# Patient Record
Sex: FEMALE | Race: WHITE | NOT HISPANIC OR LATINO | Employment: OTHER | URBAN - METROPOLITAN AREA
[De-identification: names, ages, dates, MRNs, and addresses within clinical notes are randomized per-mention and may not be internally consistent; named-entity substitution may affect disease eponyms.]

---

## 2019-03-29 ENCOUNTER — OFFICE VISIT (OUTPATIENT)
Dept: GASTROENTEROLOGY | Facility: CLINIC | Age: 74
End: 2019-03-29
Payer: COMMERCIAL

## 2019-03-29 VITALS
BODY MASS INDEX: 27.57 KG/M2 | SYSTOLIC BLOOD PRESSURE: 145 MMHG | HEART RATE: 60 BPM | HEIGHT: 62 IN | WEIGHT: 149.8 LBS | DIASTOLIC BLOOD PRESSURE: 72 MMHG

## 2019-03-29 DIAGNOSIS — Z12.11 COLON CANCER SCREENING: ICD-10-CM

## 2019-03-29 DIAGNOSIS — Z12.11 COLON CANCER SCREENING: Primary | ICD-10-CM

## 2019-03-29 DIAGNOSIS — K62.5 RECTAL BLEEDING: ICD-10-CM

## 2019-03-29 DIAGNOSIS — I25.10 CORONARY ARTERY DISEASE INVOLVING NATIVE CORONARY ARTERY OF NATIVE HEART WITHOUT ANGINA PECTORIS: Primary | ICD-10-CM

## 2019-03-29 PROCEDURE — 99204 OFFICE O/P NEW MOD 45 MIN: CPT | Performed by: NURSE PRACTITIONER

## 2019-03-29 RX ORDER — CLOPIDOGREL BISULFATE 75 MG/1
75 TABLET ORAL DAILY
COMMUNITY

## 2019-03-29 RX ORDER — CHLORAL HYDRATE 500 MG
1000 CAPSULE ORAL DAILY
COMMUNITY

## 2019-03-29 RX ORDER — DIPHENOXYLATE HYDROCHLORIDE AND ATROPINE SULFATE 2.5; .025 MG/1; MG/1
1 TABLET ORAL DAILY
COMMUNITY

## 2019-03-29 RX ORDER — LANSOPRAZOLE 15 MG/1
15 CAPSULE, DELAYED RELEASE ORAL DAILY
COMMUNITY

## 2019-03-29 RX ORDER — DOXYCYCLINE HYCLATE 100 MG/1
100 CAPSULE ORAL EVERY 12 HOURS SCHEDULED
COMMUNITY
End: 2019-03-29 | Stop reason: CLARIF

## 2019-03-29 RX ORDER — FUROSEMIDE 20 MG/1
20 TABLET ORAL 2 TIMES DAILY
COMMUNITY

## 2019-03-29 RX ORDER — ROSUVASTATIN CALCIUM 10 MG/1
10 TABLET, COATED ORAL DAILY
COMMUNITY

## 2019-03-29 RX ORDER — LEVOTHYROXINE AND LIOTHYRONINE 19; 4.5 UG/1; UG/1
30 TABLET ORAL DAILY
COMMUNITY

## 2019-03-29 RX ORDER — LOSARTAN POTASSIUM 50 MG/1
25 TABLET ORAL DAILY
COMMUNITY

## 2019-03-29 RX ORDER — MAG HYDROX/ALUMINUM HYD/SIMETH 400-400-40
SUSPENSION, ORAL (FINAL DOSE FORM) ORAL
COMMUNITY

## 2019-03-29 RX ORDER — ASCORBIC ACID 500 MG
500 TABLET ORAL 2 TIMES DAILY
COMMUNITY

## 2019-03-29 RX ORDER — OMEPRAZOLE 20 MG/1
20 CAPSULE, DELAYED RELEASE ORAL DAILY
COMMUNITY
End: 2019-03-29 | Stop reason: CLARIF

## 2019-03-29 NOTE — PATIENT INSTRUCTIONS
Will arrange for colonoscopy  Hold Plavix for 5 days if cleared by Dr Jeanne Gary  Discussed that we will need to hold the Plavix for 5 days and holding this places her at a slight risk of developing a possible thromboembolic complication    I recommend to take a baby aspirin while the Plavix is being held for some cardio protection

## 2019-03-29 NOTE — PROGRESS NOTES
[id number] - Outpatient Consultation  Trena Reese 75 y o  female MRN: [id number]  Encounter: [id number]    ASSESSMENT AND PLAN:      1  Coronary artery disease involving native coronary artery of native heart without angina pectoris  Maintained on Plavix for a history of a coronary artery bypass graft in 2015  Hold Plavix for 5 days prior to colonoscopy if cleared by her cardiologist, Dr Selwyn Sosa  The patient and her son understand that holding anti-platelet medication does put her at a slight increased risk of developing a possible thromboembolic complication  I informed her that she could take a baby aspirin while the Plavix is being held  - hold Plavix for 5 days prior to colonoscopy    3  Rectal bleeding    One episode of rectal bleeding this past summer that occurred following a hard bowel movement  No further episodes since  Likely hemorrhoidal bleed  Unable to entirely exclude a colorectal polyp or colorectal neoplasm as she has never undergone a screening colonoscopy    2  Colon cancer screening    She has yet to undergo a screening colonoscopy  Followup Appointment[de-identified]  As needed  ______________________________________________________________________    Chief Complaint   Patient presents with    Rectal Bleeding     once in July       HPI:   Trena Reese is a 75y o  year old female who presents with 1 episode of rectal bleeding this past summer that occurred following a hard bowel movement  No further episodes prior or following  Denies any abdominal pain, weight loss, change in bowel habits  Occasionally her stools will be hard and other times it will be soft  This has been ongoing for years  Appetite good  Denies any nausea or vomiting  Does report some heartburn that is alleviated on daily PPI therapy      Historical Information   Past Medical History:   Diagnosis Date    Aortic insufficiency     [facility]A [facility]cere[facility] vascular accident[facility] [address] )  Hyperlipidemia     Hypertension     Hypothyroidism      Past Surgical History:   Procedure Laterality Date    CORONARY ARTERY BYPASS GRAFT      Two thousand fifteen    SALIVARY GLAND SURGERY       Social History     Substance and Sexual Activity   Alcohol Use Yes    Comment: about twice a year     Social History     Substance and Sexual Activity   Drug Use Never     Social History     Tobacco Use   Smoking Status Former Smoker    Last attempt to quit: 3/29/2014    Years since quittin 0   Smokeless Tobacco Never Used     Family History   Problem Relation Age of Onset    Gallbladder disease Mother     Heart disease Sister     Prostate cancer Brother     Pancreatitis Brother     Heart disease Sister     Heart disease Sister        Meds/Allergies     Current Outpatient Medications:     ascorbic acid (VITAMIN C) 500 mg tablet    Cholecalciferol (VITAMIN D3) 5000 units CAPS    clopidogrel (PLAVIX) 75 mg tablet    doxycycline hyclate (VIBRAMYCIN) 100 mg capsule    furosemide (LASIX) 20 mg tablet    losartan (COZAAR) 50 mg tablet    metoprolol tartrate (LOPRESSOR) 25 mg tablet    multivitamin (THERAGRAN) TABS    Omega-3 Fatty Acids (FISH OIL) 1,000 mg    omeprazole (PriLOSEC) 20 mg delayed release capsule    rosuvastatin (CRESTOR) 10 MG tablet    thyroid desiccated (ARMOUR) 30 mg tablet    Allergies   Allergen Reactions    Codeine Hives    Sulfa Antibiotics      Other reaction(s): Itching, Pruritis       PHYSICAL EXAM:    Blood pressure 145/72, pulse 60, height 5' 2" (1 575 m), weight 67 9 kg (149 lb 12 8 oz)  Body mass index is 27 4 kg/m²  General Appearance: NAD, cooperative, alert  HEENT:  Normocephalic, atraumatic, anicteric  Lungs:  Clear to auscultation bilaterally; no rales, rhonchi or wheezing; respirations unlabored   Heart:  Regular rate and rhythm; no murmur, rub, or gallop    Abdomen:  Soft, non-tender, non-distended; normal bowel sounds; no masses, no organomegaly Rectal:  Deferred   Extremities: No cyanosis, clubbing or edema   Skin: No jaundice, rashes, or lesions   Psych: Normal affect, good eye contact  Neuro: No gross deficits, AAOx3    Lab Results:   No results found for: WBC, HGB, HCT, MCV, PLT  No results found for: NA, K, CL, CO2, ANIONGAP, BUN, CREATININE, GLUCOSE, GLUF, CALCIUM, CORRECTEDCA, AST, ALT, ALKPHOS, PROT, BILITOT, EGFR  No results found for: IRON, TIBC, FERRITIN  No results found for: LIPASE    Radiology Results:   No results found  REVIEW OF SYSTEMS:    CONSTITUTIONAL: Denies any fever, chills, rigors, and weight loss  HEENT: No earache or tinnitus  Denies hearing loss or visual disturbances  Swollen glands  CARDIOVASCULAR: No chest pain or palpitations  RESPIRATORY: Denies any cough, hemoptysis, shortness of breath or dyspnea on exertion  Positive for wheezing  GASTROINTESTINAL: As noted in the History of Present Illness  GENITOURINARY: No problems with urination  Denies any hematuria or dysuria  NEUROLOGIC: No dizziness or vertigo, denies headaches  Positive for loss of strength  MUSCULOSKELETAL: Denies any muscle or joint pain  Positive for leg cramps  SKIN: Denies skin rashes or itching  ENDOCRINE: Denies excessive thirst  Denies intolerance to heat or cold  PSYCHOSOCIAL: Denies depression or anxiety  Denies any recent memory loss

## 2019-04-01 ENCOUNTER — TELEPHONE (OUTPATIENT)
Dept: GASTROENTEROLOGY | Facility: CLINIC | Age: 74
End: 2019-04-01

## 2019-04-11 ENCOUNTER — TELEPHONE (OUTPATIENT)
Dept: GASTROENTEROLOGY | Facility: CLINIC | Age: 74
End: 2019-04-11

## 2019-04-25 ENCOUNTER — TELEPHONE (OUTPATIENT)
Dept: GASTROENTEROLOGY | Facility: CLINIC | Age: 74
End: 2019-04-25

## 2024-02-21 PROBLEM — Z12.11 COLON CANCER SCREENING: Status: RESOLVED | Noted: 2019-03-29 | Resolved: 2024-02-21

## 2025-07-19 PROBLEM — N28.1 CYST OF KIDNEY, ACQUIRED: Status: ACTIVE | Noted: 2025-07-19

## 2025-07-19 PROBLEM — K11.8 OTHER DISEASES OF SALIVARY GLANDS: Status: ACTIVE | Noted: 2025-07-19

## 2025-07-19 PROBLEM — D11.9 BENIGN NEOPLASM OF MAJOR SALIVARY GLAND, UNSPECIFIED: Status: ACTIVE | Noted: 2025-07-19

## 2025-07-19 PROBLEM — I48.0 PAROXYSMAL ATRIAL FIBRILLATION (HCC): Status: ACTIVE | Noted: 2025-07-19

## 2025-07-19 PROBLEM — I70.0 ATHEROSCLEROSIS OF AORTA (HCC): Status: ACTIVE | Noted: 2025-07-19

## 2025-07-19 PROBLEM — I35.0 NONRHEUMATIC AORTIC (VALVE) STENOSIS: Status: ACTIVE | Noted: 2025-07-19

## 2025-07-19 PROBLEM — K31.89 OTHER DISEASES OF STOMACH AND DUODENUM: Status: ACTIVE | Noted: 2025-07-19

## 2025-07-19 PROBLEM — K43.2 INCISIONAL HERNIA WITHOUT OBSTRUCTION OR GANGRENE: Status: ACTIVE | Noted: 2025-07-19

## 2025-07-19 PROBLEM — E78.00 PURE HYPERCHOLESTEROLEMIA, UNSPECIFIED: Status: ACTIVE | Noted: 2025-07-19

## 2025-07-19 PROBLEM — D68.69 OTHER THROMBOPHILIA (HCC): Status: ACTIVE | Noted: 2025-07-19

## 2025-07-19 PROBLEM — G81.91 HEMIPLEGIA, UNSPECIFIED AFFECTING RIGHT DOMINANT SIDE (HCC): Status: ACTIVE | Noted: 2025-07-19

## 2025-07-19 PROBLEM — I27.20 PULMONARY HYPERTENSION, UNSPECIFIED (HCC): Status: ACTIVE | Noted: 2025-07-19

## 2025-07-19 PROBLEM — R93.89 ABNORMAL FINDINGS ON DIAGNOSTIC IMAGING OF OTHER SPECIFIED BODY STRUCTURES: Status: ACTIVE | Noted: 2025-07-19

## 2025-07-19 PROBLEM — J43.9 EMPHYSEMA, UNSPECIFIED (HCC): Status: ACTIVE | Noted: 2025-07-19

## 2025-07-19 PROBLEM — E78.5 HYPERLIPIDEMIA, UNSPECIFIED: Status: ACTIVE | Noted: 2025-07-19

## 2025-07-19 PROBLEM — D69.3 IMMUNE THROMBOCYTOPENIC PURPURA (HCC): Status: ACTIVE | Noted: 2025-07-19

## 2025-07-19 PROBLEM — Z87.891 PERSONAL HISTORY OF NICOTINE DEPENDENCE: Status: ACTIVE | Noted: 2025-07-19

## 2025-07-19 PROBLEM — I50.30 UNSPECIFIED DIASTOLIC (CONGESTIVE) HEART FAILURE (HCC): Status: ACTIVE | Noted: 2025-07-19

## 2025-07-19 PROBLEM — I25.5 ISCHEMIC CARDIOMYOPATHY: Status: ACTIVE | Noted: 2025-07-19

## 2025-07-19 PROBLEM — R59.1 GENERALIZED ENLARGED LYMPH NODES: Status: ACTIVE | Noted: 2025-07-19

## 2025-07-19 PROBLEM — I35.1 NONRHEUMATIC AORTIC (VALVE) INSUFFICIENCY: Status: ACTIVE | Noted: 2025-07-19

## 2025-07-19 PROBLEM — I71.40 ABDOMINAL AORTIC ANEURYSM WITHOUT RUPTURE (HCC): Status: ACTIVE | Noted: 2025-07-19

## 2025-07-19 PROBLEM — K76.9 LIVER DISEASE, UNSPECIFIED: Status: ACTIVE | Noted: 2025-07-19

## 2025-07-19 PROBLEM — Z28.21 IMMUNIZATION NOT CARRIED OUT BECAUSE OF PATIENT REFUSAL: Status: ACTIVE | Noted: 2025-07-19

## 2025-07-19 PROBLEM — K74.60 UNSPECIFIED CIRRHOSIS OF LIVER (HCC): Status: ACTIVE | Noted: 2025-07-19

## 2025-07-19 PROBLEM — I73.9 PERIPHERAL VASCULAR DISEASE, UNSPECIFIED (HCC): Status: ACTIVE | Noted: 2025-07-19

## 2025-07-19 PROBLEM — Z28.310 UNVACCINATED FOR COVID-19: Status: ACTIVE | Noted: 2025-07-19

## 2025-07-19 PROBLEM — I44.7 LEFT BUNDLE-BRANCH BLOCK, UNSPECIFIED: Status: ACTIVE | Noted: 2025-07-19

## 2025-07-19 PROBLEM — I25.2 OLD MYOCARDIAL INFARCTION: Status: ACTIVE | Noted: 2025-07-19

## 2025-07-19 PROBLEM — N18.31 STAGE 3A CHRONIC KIDNEY DISEASE (HCC): Status: ACTIVE | Noted: 2025-07-19

## 2025-07-19 PROBLEM — D73.5 INFARCTION OF SPLEEN: Status: ACTIVE | Noted: 2025-07-19

## 2025-07-19 PROBLEM — M81.0 AGE-RELATED OSTEOPOROSIS WITHOUT CURRENT PATHOLOGICAL FRACTURE: Status: ACTIVE | Noted: 2025-07-19

## 2025-07-19 PROBLEM — I10 ESSENTIAL (PRIMARY) HYPERTENSION: Status: ACTIVE | Noted: 2025-07-19

## 2025-07-19 PROBLEM — I13.10 HYPERTENSIVE HEART AND CHRONIC KIDNEY DISEASE WITHOUT HEART FAILURE, WITH STAGE 1 THROUGH STAGE 4 CHRONIC KIDNEY DISEASE, OR UNSPECIFIED CHRONIC KIDNEY DISEASE: Status: ACTIVE | Noted: 2025-07-19

## 2025-07-19 PROBLEM — I71.20: Status: ACTIVE | Noted: 2025-07-19

## 2025-07-19 PROBLEM — E03.9 HYPOTHYROIDISM, UNSPECIFIED: Status: ACTIVE | Noted: 2025-07-19

## 2025-07-19 PROBLEM — N81.10 CYSTOCELE, UNSPECIFIED: Status: ACTIVE | Noted: 2025-07-19

## 2025-07-30 ENCOUNTER — TELEPHONE (OUTPATIENT)
Age: 80
End: 2025-07-30

## 2025-07-30 DIAGNOSIS — I48.0 PAROXYSMAL ATRIAL FIBRILLATION (HCC): Primary | ICD-10-CM

## 2025-08-08 ENCOUNTER — OFFICE VISIT (OUTPATIENT)
Age: 80
End: 2025-08-08
Payer: COMMERCIAL

## 2025-08-08 VITALS
TEMPERATURE: 98.2 F | BODY MASS INDEX: 28.78 KG/M2 | HEART RATE: 79 BPM | WEIGHT: 146.6 LBS | SYSTOLIC BLOOD PRESSURE: 148 MMHG | HEIGHT: 60 IN | OXYGEN SATURATION: 98 % | DIASTOLIC BLOOD PRESSURE: 64 MMHG

## 2025-08-08 DIAGNOSIS — I71.40 ABDOMINAL AORTIC ANEURYSM (AAA) WITHOUT RUPTURE, UNSPECIFIED PART (HCC): Primary | ICD-10-CM

## 2025-08-08 DIAGNOSIS — I27.20 PULMONARY HYPERTENSION, UNSPECIFIED (HCC): ICD-10-CM

## 2025-08-08 DIAGNOSIS — I48.0 PAROXYSMAL ATRIAL FIBRILLATION (HCC): ICD-10-CM

## 2025-08-08 DIAGNOSIS — I50.32 CHRONIC DIASTOLIC CONGESTIVE HEART FAILURE (HCC): ICD-10-CM

## 2025-08-08 DIAGNOSIS — J43.9 PULMONARY EMPHYSEMA, UNSPECIFIED EMPHYSEMA TYPE (HCC): ICD-10-CM

## 2025-08-08 DIAGNOSIS — E03.9 ACQUIRED HYPOTHYROIDISM: ICD-10-CM

## 2025-08-08 DIAGNOSIS — M54.31 BILATERAL SCIATICA: ICD-10-CM

## 2025-08-08 DIAGNOSIS — M54.32 BILATERAL SCIATICA: ICD-10-CM

## 2025-08-08 DIAGNOSIS — K74.60 CIRRHOSIS OF LIVER WITHOUT ASCITES, UNSPECIFIED HEPATIC CIRRHOSIS TYPE (HCC): ICD-10-CM

## 2025-08-08 DIAGNOSIS — I69.951 HEMIPLEGIA OF RIGHT DOMINANT SIDE AS LATE EFFECT OF CEREBROVASCULAR DISEASE, UNSPECIFIED CEREBROVASCULAR DISEASE TYPE, UNSPECIFIED HEMIPLEGIA TYPE (HCC): ICD-10-CM

## 2025-08-08 DIAGNOSIS — H26.9 CATARACT: ICD-10-CM

## 2025-08-08 DIAGNOSIS — N18.31 STAGE 3A CHRONIC KIDNEY DISEASE (HCC): ICD-10-CM

## 2025-08-08 DIAGNOSIS — K21.9 GASTROESOPHAGEAL REFLUX DISEASE WITHOUT ESOPHAGITIS: ICD-10-CM

## 2025-08-08 PROBLEM — K76.9 LIVER DISEASE, UNSPECIFIED: Status: RESOLVED | Noted: 2025-07-19 | Resolved: 2025-08-08

## 2025-08-08 PROBLEM — K62.5 RECTAL BLEEDING: Status: RESOLVED | Noted: 2019-03-29 | Resolved: 2025-08-08

## 2025-08-08 PROBLEM — Z28.21 IMMUNIZATION NOT CARRIED OUT BECAUSE OF PATIENT REFUSAL: Status: RESOLVED | Noted: 2025-07-19 | Resolved: 2025-08-08

## 2025-08-08 PROBLEM — D11.9 BENIGN NEOPLASM OF MAJOR SALIVARY GLAND, UNSPECIFIED: Status: RESOLVED | Noted: 2025-07-19 | Resolved: 2025-08-08

## 2025-08-08 PROBLEM — K11.8 OTHER DISEASES OF SALIVARY GLANDS: Status: RESOLVED | Noted: 2025-07-19 | Resolved: 2025-08-08

## 2025-08-08 PROBLEM — R59.1 GENERALIZED ENLARGED LYMPH NODES: Status: RESOLVED | Noted: 2025-07-19 | Resolved: 2025-08-08

## 2025-08-08 PROBLEM — R93.89 ABNORMAL FINDINGS ON DIAGNOSTIC IMAGING OF OTHER SPECIFIED BODY STRUCTURES: Status: RESOLVED | Noted: 2025-07-19 | Resolved: 2025-08-08

## 2025-08-08 PROBLEM — K31.89 OTHER DISEASES OF STOMACH AND DUODENUM: Status: RESOLVED | Noted: 2025-07-19 | Resolved: 2025-08-08

## 2025-08-08 PROBLEM — D73.5 INFARCTION OF SPLEEN: Status: RESOLVED | Noted: 2025-07-19 | Resolved: 2025-08-08

## 2025-08-08 PROCEDURE — 99215 OFFICE O/P EST HI 40 MIN: CPT | Performed by: FAMILY MEDICINE

## 2025-08-08 RX ORDER — MECOBALAMIN 5000 MCG
15 TABLET,DISINTEGRATING ORAL DAILY
Qty: 90 CAPSULE | Refills: 3 | Status: SHIPPED | OUTPATIENT
Start: 2025-08-08

## 2025-08-08 RX ORDER — EMPAGLIFLOZIN 10 MG/1
1 TABLET, FILM COATED ORAL DAILY
COMMUNITY
Start: 2025-07-28

## 2025-08-08 RX ORDER — METOPROLOL TARTRATE 50 MG
75 TABLET ORAL EVERY 12 HOURS SCHEDULED
COMMUNITY
Start: 2025-05-04

## 2025-08-08 RX ORDER — NITROGLYCERIN 0.4 MG/1
0.4 TABLET SUBLINGUAL
COMMUNITY
Start: 2025-07-11

## 2025-08-08 RX ORDER — FUROSEMIDE 20 MG/1
20 TABLET ORAL 2 TIMES DAILY
Qty: 180 TABLET | Refills: 3 | Status: SHIPPED | OUTPATIENT
Start: 2025-08-08

## 2025-08-08 RX ORDER — LEVOTHYROXINE SODIUM 88 UG/1
88 TABLET ORAL DAILY
COMMUNITY
Start: 2025-06-25 | End: 2025-08-08 | Stop reason: SDUPTHER

## 2025-08-08 RX ORDER — LEVOTHYROXINE SODIUM 88 UG/1
88 TABLET ORAL DAILY
Qty: 90 TABLET | Refills: 3 | Status: SHIPPED | OUTPATIENT
Start: 2025-08-08

## 2025-08-08 RX ORDER — LOSARTAN POTASSIUM 100 MG/1
1 TABLET ORAL DAILY
COMMUNITY
Start: 2025-05-04 | End: 2025-08-15

## 2025-08-12 ENCOUNTER — OFFICE VISIT (OUTPATIENT)
Age: 80
End: 2025-08-12
Payer: COMMERCIAL